# Patient Record
Sex: FEMALE
[De-identification: names, ages, dates, MRNs, and addresses within clinical notes are randomized per-mention and may not be internally consistent; named-entity substitution may affect disease eponyms.]

---

## 2022-08-03 PROBLEM — Z00.00 ENCOUNTER FOR PREVENTIVE HEALTH EXAMINATION: Status: ACTIVE | Noted: 2022-08-03

## 2022-08-09 ENCOUNTER — APPOINTMENT (OUTPATIENT)
Dept: SURGERY | Facility: CLINIC | Age: 22
End: 2022-08-09

## 2023-08-28 ENCOUNTER — EMERGENCY (EMERGENCY)
Facility: HOSPITAL | Age: 23
LOS: 1 days | Discharge: HOME | End: 2023-08-28
Admitting: STUDENT IN AN ORGANIZED HEALTH CARE EDUCATION/TRAINING PROGRAM
Payer: COMMERCIAL

## 2023-08-28 VITALS
RESPIRATION RATE: 18 BRPM | HEART RATE: 88 BPM | OXYGEN SATURATION: 100 % | TEMPERATURE: 98 F | SYSTOLIC BLOOD PRESSURE: 130 MMHG | DIASTOLIC BLOOD PRESSURE: 82 MMHG

## 2023-08-28 VITALS
SYSTOLIC BLOOD PRESSURE: 112 MMHG | DIASTOLIC BLOOD PRESSURE: 86 MMHG | RESPIRATION RATE: 18 BRPM | TEMPERATURE: 98 F | OXYGEN SATURATION: 100 % | HEART RATE: 96 BPM

## 2023-08-28 LAB

## 2023-08-28 PROCEDURE — 99284 EMERGENCY DEPT VISIT MOD MDM: CPT

## 2023-08-28 RX ORDER — ACETAMINOPHEN 500 MG
650 TABLET ORAL ONCE
Refills: 0 | Status: COMPLETED | OUTPATIENT
Start: 2023-08-28 | End: 2023-08-28

## 2023-08-28 RX ORDER — ONDANSETRON 8 MG/1
4 TABLET, FILM COATED ORAL ONCE
Refills: 0 | Status: COMPLETED | OUTPATIENT
Start: 2023-08-28 | End: 2023-08-28

## 2023-08-28 RX ADMIN — Medication 650 MILLIGRAM(S): at 18:48

## 2023-08-28 RX ADMIN — ONDANSETRON 4 MILLIGRAM(S): 8 TABLET, FILM COATED ORAL at 18:48

## 2023-08-28 NOTE — ED PROVIDER NOTE - PATIENT PORTAL LINK FT
You can access the FollowMyHealth Patient Portal offered by [facility] by registering at the following website: [email or web address]. By joining [employer]’s FollowMyHealth portal, you will also be able to view your health information using other applications (apps) compatible with our system.

## 2023-08-28 NOTE — ED PROVIDER NOTE - OBJECTIVE STATEMENT
21 y/o female no pmh c/o flu like illness x1 day. Pt admits to sore throat, headache, myalgias and chills. Pt to cold and flu medicine with little relief. Pt has + sick contact at work who tested + for covid. Pt admits to 1 episodes of emesis. Denies chest pain, sob, abd pain, diarrhea, dysuria, numbness, tingling, weakness, dizziness, syncope, fever or chills.

## 2023-08-28 NOTE — ED ADULT NURSE NOTE - NSFALLUNIVINTERV_ED_ALL_ED
Bed/Stretcher in lowest position, wheels locked, appropriate side rails in place/Call bell, personal items and telephone in reach/Instruct patient to call for assistance before getting out of bed/chair/stretcher/Non-slip footwear applied when patient is off stretcher/[place] to call system/Physically safe environment - no spills, clutter or unnecessary equipment/Purposeful proactive rounding/Room/bathroom lighting operational, light cord in reach

## 2023-08-28 NOTE — ED ADULT TRIAGE NOTE - CHIEF COMPLAINT QUOTE
patient c/o headache, throat hurts, fatigue, malaise since lastnight Pt denies chest pain sob. Denies PHX normal...

## 2023-08-28 NOTE — ED PROVIDER NOTE - CLINICAL SUMMARY MEDICAL DECISION MAKING FREE TEXT BOX
23 y/o female no pmh c/o flu like symptoms x 1 day. + sore throat, headache, myalgias and chills. + sick contact at work with covid. Denies recent travel. Denies chest pain, sob, abd pain, dizziness or syncope. Pt is well appearing, nad, afebrile, no tonsillar exudate, lungs cta b/l ,and soft ntnd. Likely viral illness, low concern for strep or PNA- will check rvp, tylenol, zofran and dc.

## 2023-08-28 NOTE — ED ADULT NURSE NOTE - CHIEF COMPLAINT QUOTE
patient c/o headache, throat hurts, fatigue, malaise since lastnight Pt denies chest pain sob. Denies PHX

## 2023-08-28 NOTE — ED ADULT NURSE NOTE - OBJECTIVE STATEMENT
Pt is a 23 y/o Female, A&Ox4, ambulatory with no reported PHx, presenting to the ED with c/o throat pain, headache and generalized joint pain since last night. Pt states she had positive sick contact. Respirations even and unlabored, chest rise equal b/l. VS as noted in flow sheets. Pt denies chest pain, SOB, fever, cough, chills, abdominal pain, N/V/D, h/a, dizziness, numbness/tingling or any urinary symptoms at this time. Medications administered as ordered, see MAR. No acute distress noted. Safety maintained throughout. Will continue to monitor.

## 2023-08-28 NOTE — ED ADULT NURSE NOTE - CAS DISCH TRANSFER METHOD
Pt employee at [employer] and escorted back to [employer] [facility] with security/Other

## 2023-10-19 ENCOUNTER — EMERGENCY (EMERGENCY)
Facility: HOSPITAL | Age: 23
LOS: 1 days | Discharge: HOME | End: 2023-10-19
Attending: EMERGENCY MEDICINE | Admitting: EMERGENCY MEDICINE
Payer: COMMERCIAL

## 2023-10-19 VITALS
SYSTOLIC BLOOD PRESSURE: 141 MMHG | HEART RATE: 78 BPM | OXYGEN SATURATION: 99 % | TEMPERATURE: 98 F | RESPIRATION RATE: 16 BRPM | DIASTOLIC BLOOD PRESSURE: 87 MMHG

## 2023-10-19 PROCEDURE — 99284 EMERGENCY DEPT VISIT MOD MDM: CPT

## 2023-10-19 RX ORDER — IBUPROFEN 200 MG
1 TABLET ORAL
Qty: 30 | Refills: 0
Start: 2023-10-19

## 2023-10-19 RX ORDER — ACETAMINOPHEN 500 MG
2 TABLET ORAL
Qty: 100 | Refills: 0
Start: 2023-10-19

## 2023-10-19 RX ORDER — PENICILLIN V POTASSIUM 250 MG
500 TABLET ORAL ONCE
Refills: 0 | Status: COMPLETED | OUTPATIENT
Start: 2023-10-19 | End: 2023-10-19

## 2023-10-19 RX ORDER — ACETAMINOPHEN 500 MG
975 TABLET ORAL ONCE
Refills: 0 | Status: COMPLETED | OUTPATIENT
Start: 2023-10-19 | End: 2023-10-19

## 2023-10-19 RX ORDER — PENICILLIN V POTASIUM 500 MG/1
1 TABLET OROPHARYNGEAL
Qty: 30 | Refills: 0
Start: 2023-10-19 | End: 2023-10-28

## 2023-10-19 RX ORDER — DEXAMETHASONE 0.5 MG/5ML
10 ELIXIR ORAL ONCE
Refills: 0 | Status: COMPLETED | OUTPATIENT
Start: 2023-10-19 | End: 2023-10-19

## 2023-10-19 RX ORDER — IBUPROFEN 200 MG
400 TABLET ORAL ONCE
Refills: 0 | Status: COMPLETED | OUTPATIENT
Start: 2023-10-19 | End: 2023-10-19

## 2023-10-19 RX ADMIN — Medication 400 MILLIGRAM(S): at 08:28

## 2023-10-19 RX ADMIN — Medication 10 MILLIGRAM(S): at 08:28

## 2023-10-19 RX ADMIN — Medication 500 MILLIGRAM(S): at 08:29

## 2023-10-19 RX ADMIN — Medication 975 MILLIGRAM(S): at 08:28

## 2023-10-19 NOTE — ED ADULT TRIAGE NOTE - CHIEF COMPLAINT QUOTE
Pt c/o sore throat x 3 days. Endorses pain with swallowing. Denies fever, chills, cough, sob. NAD. Pt talking in full sentences. No Past Medical History.

## 2023-10-19 NOTE — ED PROVIDER NOTE - CLINICAL SUMMARY MEDICAL DECISION MAKING FREE TEXT BOX
Jackie: Sore throat w/ tonsillar exudate. No HIV/mono/STD risks. No cervical LAD. Treat for strep throat.

## 2023-10-19 NOTE — ED PROVIDER NOTE - NSFOLLOWUPINSTRUCTIONS_ED_ALL_ED_FT
Take medications as prescribed for: likely strep throat.    Return if unable to breathe well, swallow liquids, or speak well.

## 2023-10-19 NOTE — ED ADULT NURSE NOTE - NSFALLUNIVINTERV_ED_ALL_ED
Bed/Stretcher in lowest position, wheels locked, appropriate side rails in place/Call bell, personal items and telephone in reach/Instruct patient to call for assistance before getting out of bed/chair/stretcher/Non-slip footwear applied when patient is off stretcher/[place] to call system/Physically safe environment - no spills, clutter or unnecessary equipment/Purposeful proactive rounding/Room/bathroom lighting operational, light cord in reach

## 2023-10-19 NOTE — ED ADULT NURSE NOTE - OBJECTIVE STATEMENT
Patient received in intake room 3, A&Ox4, ambulatory at baseline c/o left sided throat pain. Patient states to have noticed "white stuff" on left side of throat. Airway intact, no drooling noted, no active respiratory distress noted. denies SOB, CP, nausea, vomiting. Breathing even and unlabored. meds given as per order. Awaiting DC paperwork. No IV placed. Safety maintained.

## 2023-10-19 NOTE — ED PROVIDER NOTE - PHYSICAL EXAMINATION
Well-appearing, well nourished, awake, alert, oriented to person, place, time/situation and in no apparent distress.    Airway patent. Neck supple. No cervical LAD. L tonsil w/ exudate. No problem w/ phonation/swallowing/breathing.    Eyes without scleral injection. No jaundice.    Strong pulse.    Respirations unlabored.    Abdomen soft, non-tender, no guarding.    MSK: Spine appears normal, range of motion is not limited, no muscle or joint tenderness.    Alert and oriented, no gross motor or sensory deficits.    Skin: normal color for race, warm, dry and intact. No evidence of rash.    No SI/HI.

## 2023-10-19 NOTE — ED PROVIDER NOTE - PATIENT PORTAL LINK FT
You can access the FollowMyHealth Patient Portal offered by [facility] by registering at the following website: [email or web address]. By joining [employer]’s FollowMyHealth portal, you will also be able to view your health information using other applications (apps) compatible with our system.

## 2023-11-23 ENCOUNTER — EMERGENCY (EMERGENCY)
Facility: HOSPITAL | Age: 23
LOS: 1 days | Discharge: HOME | End: 2023-11-23
Attending: EMERGENCY MEDICINE | Admitting: EMERGENCY MEDICINE
Payer: COMMERCIAL

## 2023-11-23 VITALS
HEART RATE: 75 BPM | DIASTOLIC BLOOD PRESSURE: 52 MMHG | TEMPERATURE: 98 F | SYSTOLIC BLOOD PRESSURE: 124 MMHG | RESPIRATION RATE: 16 BRPM | OXYGEN SATURATION: 100 %

## 2023-11-23 PROCEDURE — 10060 I&D ABSCESS SIMPLE/SINGLE: CPT

## 2023-11-23 PROCEDURE — 99284 EMERGENCY DEPT VISIT MOD MDM: CPT | Mod: 25

## 2023-11-23 RX ORDER — CEPHALEXIN 500 MG
500 CAPSULE ORAL
Refills: 0 | Status: DISCONTINUED | OUTPATIENT
Start: 2023-11-23 | End: 2023-11-26

## 2023-11-23 RX ORDER — KETOROLAC TROMETHAMINE 30 MG/ML
15 SYRINGE (ML) INJECTION ONCE
Refills: 0 | Status: DISCONTINUED | OUTPATIENT
Start: 2023-11-23 | End: 2023-11-23

## 2023-11-23 RX ORDER — CEPHALEXIN 500 MG
1 CAPSULE ORAL
Qty: 20 | Refills: 0
Start: 2023-11-23 | End: 2023-11-27

## 2023-11-23 RX ADMIN — Medication 500 MILLIGRAM(S): at 10:04

## 2023-11-23 RX ADMIN — Medication 15 MILLIGRAM(S): at 10:04

## 2023-11-23 NOTE — ED ADULT NURSE NOTE - NSFALLRISKINTERV_ED_ALL_ED
Assistance with ambulation/Communicate fall risk and risk factors to all staff, patient, and family/Provide visual cue: yellow wristband, yellow gown, etc/Reinforce activity limits and safety measures with patient and family/Call bell, personal items and telephone in reach/Instruct patient to call for assistance before getting out of bed/chair/stretcher/Non-slip footwear applied when patient is off stretcher/[place] to call system/Physically safe environment - no spills, clutter or unnecessary equipment/Purposeful Proactive Rounding/Room/bathroom lighting operational, light cord in reach

## 2023-11-23 NOTE — ED PROCEDURE NOTE - NS_EDPROVIDERDISPOUSERTYPE_ED_A_ED
solids
Attending Attestation (For Attendings USE Only)...
Attending Attestation (For Attendings USE Only)...

## 2023-11-23 NOTE — ED PROVIDER NOTE - NSFOLLOWUPINSTRUCTIONS_ED_ALL_ED_FT
Skin Abscess  Close-up of an abscess on the skin.  A skin abscess is an infected spot of skin. It can have pus in it. An abscess can happen in any part of your body.    Some abscesses break open (rupture) on their own. Most keep getting worse unless they are treated. If your abscess is not treated, the infection can spread deeper into your body and blood. This can make you feel sick.    What are the causes?  Germs that enter your skin. This may happen if you have:  A cut or scrape.  A wound from a needle or an insect bite.  Blocked oil or sweat glands.  A problem with the spot where your hair goes into your skin.  A fluid-filled sac called a cyst under your skin.  What increases the risk?  Having problems with how your blood moves through your body.  Having a weak body defense system (immune system).  Having diabetes.  Having dry and irritated skin.  Needing to get shots often.  Putting drugs into your body with a needle.  Having a splinter or something else in your skin.  Smoking.  What are the signs or symptoms?  A firm bump under your skin that hurts.  A bump with pus at the top.  Redness and swelling.  Warm or tender spots.  A sore on the skin.  How is this treated?  You may need to:  Put a heat pack or a warm, wet washcloth on the spot.  Have the pus drained.  Take antibiotics.  Follow these instructions at home:  Medicines    Take over-the-counter and prescription medicines only as told by your doctor.  If you were prescribed antibiotics, take them as told by your doctor. Do not stop taking them even if you start to feel better.  Abscess care    Washing hands with soap and water.  If you have an abscess that has not drained, put heat on it. Use the heat source that your doctor recommends, such as a moist heat pack or a heating pad.  Place a towel between your skin and the heat source.  Leave the heat on for 20–30 minutes.  If your skin turns bright red, take off the heat right away to prevent burns. The risk of burns is higher if you cannot feel pain, heat, or cold.  Follow instructions from your doctor about how to take care of your abscess. Make sure you:  Cover the abscess with a bandage.  Wash your hands with soap and water for at least 20 seconds before and after you change your bandage. If you cannot use soap and water, use hand .  Change your bandage as told by your doctor.  Check your abscess every day for signs that the infection is getting worse. Check for:  More redness, swelling, or pain.  More fluid or blood.  Warmth.  More pus or a worse smell.  General instructions    To keep the infection from spreading:  Do not share personal items or towels.  Do not go in a hot tub with others.  Avoid making skin contact with others.  Be careful when you get rid of used bandages or any pus from the abscess.  Do not smoke or use any products that contain nicotine or tobacco. If you need help quitting, ask your doctor.  Contact a doctor if:  You see red streaks on your skin near the abscess.  You have any signs of worse infection.  You vomit every time you eat or drink.  You have a fever, chills, or muscle aches.  The cyst or abscess comes back.  Get help right away if:  You have very bad pain.  You make less pee (urine) than normal.  This information is not intended to replace advice given to you by your health care provider. Make sure you discuss any questions you have with your health care provider.

## 2023-11-23 NOTE — ED PROCEDURE NOTE - NS ED ATTENDING STATEMENT MOD
This was a shared visit with the SHEFALI. I reviewed and verified the documentation and independently performed the documented:
Attending with

## 2023-11-23 NOTE — ED PROVIDER NOTE - ATTENDING APP SHARED VISIT CONTRIBUTION OF CARE
22 F complaining of left upper back pain and swelling for 2 days.  Patient states has noticed small firm area for 7 years.  Was told it might be "cyst."  Was referred to Derm but did not follow up. Now has [facility] pain [facility] "soft" center, worried is infected. No fever/chills. Last used PCN 1 mo. ago for ? strep throat. No hx of skin infection/abscess. L upper back 2x2cm area of swelling w central tenderness, no surrounding erythema, no heat.

## 2023-11-23 NOTE — ED PROVIDER NOTE - NS ED ATTENDING STATEMENT MOD
This was a shared visit with the SHEFALI. I reviewed and verified the documentation and independently performed the documented:

## 2023-11-23 NOTE — ED PROVIDER NOTE - PROGRESS NOTE DETAILS
Pt. with small area of ? fluid and likely sebaceous cyst. Needle aspiration w 1.5 cc of brown pus. Pt. sahara. well. Derick: Pt. with small area of ? fluid and likely sebaceous cyst. Needle aspiration w 1.5 cc of brown pus. Pt. sahara. well.

## 2023-11-23 NOTE — ED ADULT NURSE NOTE - OBJECTIVE STATEMENT
Pt A+Ox4. Pt c/o an abscess to left upper back.  Pt states it has been there about 7 years and was told it was a cyst.  It normally is hard but a few days ago it became "mushy and soft".  Pt lungs clear, equal and unlabored, abdomen soft.  Pt awaiting Ultrasound.  Will continue to monitor.

## 2023-11-23 NOTE — ED PROVIDER NOTE - CLINICAL SUMMARY MEDICAL DECISION MAKING FREE TEXT BOX
21 yo F no PMH p/w increased tenderness to posterior thoracic since yesterday. Diagnosed cyst in her back x7 years. Described as normally hard with a central opening became soft and mushy with increased tenderness and pressure. Has not taken any antibiotics previously or pain medications. LMP x2 weeks ago. Toradol, Keflex, US  Derm Cyst vs. Abscess  Dispo with follow up with PCP 23 yo F no PMH p/w increased tenderness to posterior thoracic since yesterday. Diagnosed cyst in her back x7 years. Described as normally hard with a central opening became soft and mushy with increased tenderness and pressure. Has not taken any antibiotics previously or pain medications. LMP x2 weeks ago. Toradol, Keflex, US.   Derm Cyst vs. Abscess  Dispo with follow up with PCP 21 yo F no PMH p/w increased tenderness to posterior thoracic since yesterday. Diagnosed cyst in her back x7 years. Described as normally hard with a central opening became soft and mushy with increased tenderness and pressure. Has not taken any antibiotics previously or pain medications. LMP x2 weeks ago. Toradol, Keflex, US.   Derm Cyst vs. Abscess  Dispo with follow up with PCP 21 yo F no PMH p/w increased tenderness to posterior thoracic since yesterday. Diagnosed cyst in her back x7 years. Described as normally hard with a central opening became soft and mushy with increased tenderness and pressure. Has not taken any antibiotics previously or pain medications. LMP x2 weeks ago. Toradol, Keflex, US.   Derm Cyst vs. Abscess  Dispo with follow up with PCP. 23 yo F no PMH p/w increased tenderness to posterior thoracic since yesterday. Diagnosed cyst in her back x7 years. Described as normally hard with a central opening became soft and mushy with increased tenderness and pressure. Has not taken any antibiotics previously or pain medications. LMP x2 weeks ago. Toradol, Keflex, US.   Derm Cyst vs. Abscess  Dispo with follow up with PCP.

## 2023-11-23 NOTE — ED PROVIDER NOTE - PATIENT PORTAL LINK FT
You can access the FollowMyHealth Patient Portal offered by [facility] by registering at the following website: [email or web address]. By joining [employer]’s FollowMyHealth portal, you will also be able to view your health information using other applications (apps) compatible with our system. You can access the FollowMyHealth Patient Portal offered by [facility] by registering at the following website: [email or web address]. By joining [employer]’s FollowMyHealth portal, you will also be able to view your health information using other applications (apps) compatible with our system. You can access the FollowMyHealth Patient Portal offered by [facility] by registering at the following website: [email or web address]. By joining [employer]’s FollowMyHealth portal, you will also be able to view your health information using other applications (apps) compatible with our system.

## 2023-11-23 NOTE — ED PROVIDER NOTE - OBJECTIVE STATEMENT
23 yo F no PMH p/w increased tenderness to posterior thoracic since yesterday. Reports she has had a previously diagnosed cyst in her back x7 years. Was previously seen by her PCP and given a referral to dermatology but did not follow up. Reports yesterday that the cyst which is normally hard with a central opening became soft and mushy with increased tenderness and pressure. Has not taken any antibiotics previously or pain medications. LMP x2 weeks ago. Denies fever, chills, chest pain, shortness of breath, sick contact or recent travel. 21 yo F no PMH p/w increased tenderness to posterior thoracic since yesterday. Reports she has had a previously diagnosed cyst in her back x7 years. Was previously seen by her PCP and given a referral to dermatology but did not follow up. Reports yesterday that the cyst which is normally hard with a central opening became soft and mushy with increased tenderness and pressure. Has not taken any antibiotics previously or pain medications. LMP x2 weeks ago. Denies fever, chills, chest pain, shortness of breath, sick contact or recent travel.

## 2024-01-07 ENCOUNTER — EMERGENCY (EMERGENCY)
Facility: HOSPITAL | Age: 24
LOS: 0 days | Discharge: HOME | End: 2024-01-07
Attending: STUDENT IN AN ORGANIZED HEALTH CARE EDUCATION/TRAINING PROGRAM
Payer: COMMERCIAL

## 2024-01-07 VITALS
DIASTOLIC BLOOD PRESSURE: 72 MMHG | HEART RATE: 97 BPM | HEIGHT: 69 IN | OXYGEN SATURATION: 98 % | RESPIRATION RATE: 20 BRPM | TEMPERATURE: 98 F | SYSTOLIC BLOOD PRESSURE: 108 MMHG | WEIGHT: 293 LBS

## 2024-01-07 VITALS
HEART RATE: 87 BPM | DIASTOLIC BLOOD PRESSURE: 70 MMHG | OXYGEN SATURATION: 98 % | RESPIRATION RATE: 20 BRPM | TEMPERATURE: 99 F | SYSTOLIC BLOOD PRESSURE: 112 MMHG

## 2024-01-07 DIAGNOSIS — U07.1 COVID-19: ICD-10-CM

## 2024-01-07 DIAGNOSIS — R51.9 HEADACHE, UNSPECIFIED: ICD-10-CM

## 2024-01-07 LAB
FLUAV AG NPH QL: SIGNIFICANT CHANGE UP
FLUAV AG NPH QL: SIGNIFICANT CHANGE UP
FLUBV AG NPH QL: SIGNIFICANT CHANGE UP
FLUBV AG NPH QL: SIGNIFICANT CHANGE UP
SARS-COV-2 RNA SPEC QL NAA+PROBE: DETECTED
SARS-COV-2 RNA SPEC QL NAA+PROBE: DETECTED

## 2024-01-07 PROCEDURE — 99284 EMERGENCY DEPT VISIT MOD MDM: CPT

## 2024-01-07 RX ORDER — IBUPROFEN 200 MG
600 TABLET ORAL ONCE
Refills: 0 | Status: DISCONTINUED | OUTPATIENT
Start: 2024-01-07 | End: 2024-01-07

## 2024-01-07 NOTE — ED PROVIDER NOTE - NSFOLLOWUPINSTRUCTIONS_ED_ALL_ED_FT
Rest, drink plenty of fluids  Advance activity as tolerated  Continue all previously prescribed medications as directed  Follow up with your PMD - bring copies of your results  Return to the ER for chest pain, difficulty breathing, worsening symptoms, or other new or concerning symptoms  For pain, you may take Tylenol 975mg every six hours and supplement with ibuprofen 600mg, with food, every six hours which can be taken three hours apart from the Tylenol to have a layered effect.

## 2024-01-07 NOTE — ED ADULT NURSE NOTE - NSFALLUNIVINTERV_ED_ALL_ED
Bed/Stretcher in lowest position, wheels locked, appropriate side rails in place/Call bell, personal items and telephone in reach/Instruct patient to call for assistance before getting out of bed/chair/stretcher/Non-slip footwear applied when patient is off stretcher/[place] to call system/Physically safe environment - no spills, clutter or unnecessary equipment/Purposeful proactive rounding/Room/bathroom lighting operational, light cord in reach Bed/Stretcher in lowest position, wheels locked, appropriate side rails in place/Call bell, personal items and telephone in reach/Instruct patient to call for assistance before getting out of bed/chair/stretcher/Non-slip footwear applied when patient is off stretcher/[place] to call system/Physically safe environment - no spills, clutter or unnecessary equipment/Purposeful proactive rounding/Room/bathroom lighting operational, light cord in reach

## 2024-01-07 NOTE — ED PROVIDER NOTE - CLINICAL SUMMARY MEDICAL DECISION MAKING FREE TEXT BOX
22yo female with no pmh presenting with headache, sore throat, cough.  Started yesterday.  HA generalized, mild.  Nonproductive cough.  No sob, cp, abd pain, n/v/d, fever.  Took tylenol this morning with some relief.  Exam here nonfocal.  Likely viral syndrome.  On screening, patient mentioned to nurse that she had SI 2 days ago.  This has never happened before, she feels fine now and never had a plan.  No access to firearms.  Never seen psych.  States she only had this thought in passing due to a hard day and she went and spoke with her mom and brother about it and they went out.  No HI, hallucinations.  Clinically appears psychiatrically safe for dc with outpatient follow up and patient verbalized understanding of return precautions.  Will medicate, swab, dc.

## 2024-01-07 NOTE — ED ADULT NURSE NOTE - OBJECTIVE STATEMENT
A&OX4, patient C/O flu-like symptoms . patient states runny nose/ body aches/ HA sore throat denies chest pain or SOB . patient denies any recent travels/ patient states feeling depressed thought of hurting her self 2dayx ago no active plan/ denies homicidal or suicidal ideation at this time . no relief with OTC meds PMH PCOS denies fevers

## 2024-01-07 NOTE — ED ADULT NURSE REASSESSMENT NOTE - NS ED NURSE REASSESS COMMENT FT1
MD hager, made aware of depression and  recent homicidal ideation at this time. Yoly enamorado placed on unofficial 1;1 at this time

## 2024-01-07 NOTE — ED ADULT TRIAGE NOTE - CHIEF COMPLAINT QUOTE
Right sided ha yesterday, today on the left side with generalized body pain and sore throat.  LMP 1/4/24. Denies history.

## 2024-01-07 NOTE — ED PROVIDER NOTE - PHYSICAL EXAMINATION
General appearance: Nontoxic appearing, conversant, afebrile    Eyes: anicteric sclerae, [place], EOMI   HENT: Atraumatic; oropharynx clear, MMM and no ulcerations, no pharyngeal erythema or exudate   Neck: Trachea midline; Full range of motion, supple   Pulm: CTA bl, normal respiratory effort and no intercostal retractions, normal work of breathing   CV: RRR, No murmurs, rubs, or gallops   Abdomen: Soft, non-tender, non-distended; no guarding or rebound   Extremities: No peripheral edema, no gross deformities, FROM x4   Skin: Dry, normal temperature, turgor and texture; no rash   Psych: Appropriate affect, cooperative, calm

## 2024-01-07 NOTE — ED PROVIDER NOTE - PATIENT PORTAL LINK FT
You can access the FollowMyHealth Patient Portal offered by [facility] by registering at the following website: [email or web address]. By joining [employer]’s FollowMyHealth portal, you will also be able to view your health information using other applications (apps) compatible with our system. You can access the FollowMyHealth Patient Portal offered by [facility] by registering at the following website: [email or web address]. By joining [employer]’s FollowMyHealth portal, you will also be able to view your health information using other applications (apps) compatible with our system.

## 2024-10-30 ENCOUNTER — EMERGENCY (EMERGENCY)
Facility: HOSPITAL | Age: 24
LOS: 1 days | Discharge: HOME | End: 2024-10-30
Attending: EMERGENCY MEDICINE | Admitting: EMERGENCY MEDICINE
Payer: COMMERCIAL

## 2024-10-30 VITALS
SYSTOLIC BLOOD PRESSURE: 119 MMHG | HEIGHT: 69 IN | OXYGEN SATURATION: 97 % | TEMPERATURE: 98 F | DIASTOLIC BLOOD PRESSURE: 86 MMHG | RESPIRATION RATE: 16 BRPM | HEART RATE: 99 BPM | WEIGHT: 293 LBS

## 2024-10-30 VITALS
HEART RATE: 72 BPM | RESPIRATION RATE: 17 BRPM | SYSTOLIC BLOOD PRESSURE: 112 MMHG | OXYGEN SATURATION: 100 % | TEMPERATURE: 98 F | DIASTOLIC BLOOD PRESSURE: 65 MMHG

## 2024-10-30 LAB
ALBUMIN SERPL ELPH-MCNC: 3.8 G/DL — SIGNIFICANT CHANGE UP (ref 3.3–5)
ALP SERPL-CCNC: 81 U/L — SIGNIFICANT CHANGE UP (ref 40–120)
ALT FLD-CCNC: 20 U/L — SIGNIFICANT CHANGE UP (ref 4–33)
ANION GAP SERPL CALC-SCNC: 12 MMOL/L — SIGNIFICANT CHANGE UP (ref 7–14)
AST SERPL-CCNC: 36 U/L — HIGH (ref 4–32)
BASOPHILS # BLD AUTO: 0.04 K/UL — SIGNIFICANT CHANGE UP (ref 0–0.2)
BASOPHILS NFR BLD AUTO: 0.4 % — SIGNIFICANT CHANGE UP (ref 0–2)
BILIRUB SERPL-MCNC: 0.4 MG/DL — SIGNIFICANT CHANGE UP (ref 0.2–1.2)
BUN SERPL-MCNC: 12 MG/DL — SIGNIFICANT CHANGE UP (ref 7–23)
CALCIUM SERPL-MCNC: 9.1 MG/DL — SIGNIFICANT CHANGE UP (ref 8.4–10.5)
CHLORIDE SERPL-SCNC: 106 MMOL/L — SIGNIFICANT CHANGE UP (ref 98–107)
CO2 SERPL-SCNC: 19 MMOL/L — LOW (ref 22–31)
CREAT SERPL-MCNC: 0.77 MG/DL — SIGNIFICANT CHANGE UP (ref 0.5–1.3)
D DIMER BLD IA.RAPID-MCNC: 183 NG/ML DDU — SIGNIFICANT CHANGE UP
EGFR: 110 ML/MIN/1.73M2 — SIGNIFICANT CHANGE UP
EGFR: 110 ML/MIN/1.73M2 — SIGNIFICANT CHANGE UP
EOSINOPHIL # BLD AUTO: 0.2 K/UL — SIGNIFICANT CHANGE UP (ref 0–0.5)
EOSINOPHIL NFR BLD AUTO: 2 % — SIGNIFICANT CHANGE UP (ref 0–6)
FLUAV AG NPH QL: SIGNIFICANT CHANGE UP
FLUBV AG NPH QL: SIGNIFICANT CHANGE UP
GLUCOSE SERPL-MCNC: 81 MG/DL — SIGNIFICANT CHANGE UP (ref 70–99)
HCG SERPL-ACNC: <1 MIU/ML — SIGNIFICANT CHANGE UP
HCT VFR BLD CALC: 40.2 % — SIGNIFICANT CHANGE UP (ref 34.5–45)
HGB BLD-MCNC: 14.3 G/DL — SIGNIFICANT CHANGE UP (ref 11.5–15.5)
IANC: 5.79 K/UL — SIGNIFICANT CHANGE UP (ref 1.8–7.4)
IMM GRANULOCYTES NFR BLD AUTO: 0.2 % — SIGNIFICANT CHANGE UP (ref 0–0.9)
LYMPHOCYTES # BLD AUTO: 2.56 K/UL — SIGNIFICANT CHANGE UP (ref 1–3.3)
LYMPHOCYTES # BLD AUTO: 26.1 % — SIGNIFICANT CHANGE UP (ref 13–44)
MCHC RBC-ENTMCNC: 29.3 PG — SIGNIFICANT CHANGE UP (ref 27–34)
MCHC RBC-ENTMCNC: 35.6 G/DL — SIGNIFICANT CHANGE UP (ref 32–36)
MCV RBC AUTO: 82.4 FL — SIGNIFICANT CHANGE UP (ref 80–100)
MONOCYTES # BLD AUTO: 1.21 K/UL — HIGH (ref 0–0.9)
MONOCYTES NFR BLD AUTO: 12.3 % — SIGNIFICANT CHANGE UP (ref 2–14)
NEUTROPHILS # BLD AUTO: 5.79 K/UL — SIGNIFICANT CHANGE UP (ref 1.8–7.4)
NEUTROPHILS NFR BLD AUTO: 59 % — SIGNIFICANT CHANGE UP (ref 43–77)
NRBC # BLD AUTO: 0 K/UL — SIGNIFICANT CHANGE UP (ref 0–0)
NRBC # BLD: 0 /100 WBCS — SIGNIFICANT CHANGE UP (ref 0–0)
NRBC # FLD: 0 K/UL — SIGNIFICANT CHANGE UP (ref 0–0)
NRBC BLD-RTO: 0 /100 WBCS — SIGNIFICANT CHANGE UP (ref 0–0)
PLATELET # BLD AUTO: 265 K/UL — SIGNIFICANT CHANGE UP (ref 150–400)
POTASSIUM SERPL-MCNC: 5.3 MMOL/L — SIGNIFICANT CHANGE UP (ref 3.5–5.3)
POTASSIUM SERPL-SCNC: 5.3 MMOL/L — SIGNIFICANT CHANGE UP (ref 3.5–5.3)
PROT SERPL-MCNC: 7.7 G/DL — SIGNIFICANT CHANGE UP (ref 6–8.3)
RBC # BLD: 4.88 M/UL — SIGNIFICANT CHANGE UP (ref 3.8–5.2)
RBC # FLD: 13.2 % — SIGNIFICANT CHANGE UP (ref 10.3–14.5)
RSV RNA NPH QL NAA+NON-PROBE: SIGNIFICANT CHANGE UP
SARS-COV-2 RNA SPEC QL NAA+PROBE: SIGNIFICANT CHANGE UP
SODIUM SERPL-SCNC: 137 MMOL/L — SIGNIFICANT CHANGE UP (ref 135–145)
WBC # BLD: 9.82 K/UL — SIGNIFICANT CHANGE UP (ref 3.8–10.5)
WBC # FLD AUTO: 9.82 K/UL — SIGNIFICANT CHANGE UP (ref 3.8–10.5)

## 2024-10-30 PROCEDURE — 99284 EMERGENCY DEPT VISIT MOD MDM: CPT

## 2024-10-30 PROCEDURE — 71046 X-RAY EXAM CHEST 2 VIEWS: CPT | Mod: 26

## 2024-10-30 RX ORDER — SODIUM CHLORIDE 9 G/1000ML
1000 INJECTION, SOLUTION INTRAVENOUS ONCE
Refills: 0 | Status: COMPLETED | OUTPATIENT
Start: 2024-10-30 | End: 2024-10-30

## 2024-10-30 RX ORDER — ACETAMINOPHEN 500 MG/5ML
1000 LIQUID (ML) ORAL ONCE
Refills: 0 | Status: COMPLETED | OUTPATIENT
Start: 2024-10-30 | End: 2024-10-30

## 2024-10-30 RX ADMIN — Medication 1000 MILLIGRAM(S): at 18:42

## 2024-10-30 RX ADMIN — Medication 400 MILLIGRAM(S): at 18:12

## 2024-10-30 RX ADMIN — Medication 1000 MILLIGRAM(S): at 18:27

## 2024-10-30 RX ADMIN — SODIUM CHLORIDE 1000 MILLILITER(S): 9 INJECTION, SOLUTION INTRAVENOUS at 18:11

## 2024-10-31 PROBLEM — Z78.9 OTHER SPECIFIED HEALTH STATUS: Chronic | Status: ACTIVE | Noted: 2023-08-28

## 2025-03-19 NOTE — ED PROVIDER NOTE - CCCP TRG CHIEF CMPLNT
Attempt to contact patient at listed number.Unable to contact. Voice message left to return call to clinic [phone].   
Pt return call to clinic. Informed patient of PCP response regarding patient request to have further test done regarding have GI symptom. Informed patient that PCP state that if patient continues to ave issue she may consider a GI Referral. Referral placed to Gastro. Pt verbalized understanding of information given.   
body aches/headache

## 2025-09-13 ENCOUNTER — EMERGENCY (EMERGENCY)
Facility: HOSPITAL | Age: 25
LOS: 0 days | Discharge: HOME | End: 2025-09-13
Attending: STUDENT IN AN ORGANIZED HEALTH CARE EDUCATION/TRAINING PROGRAM
Payer: COMMERCIAL

## 2025-09-13 VITALS
OXYGEN SATURATION: 95 % | SYSTOLIC BLOOD PRESSURE: 94 MMHG | RESPIRATION RATE: 18 BRPM | TEMPERATURE: 98 F | HEART RATE: 76 BPM | DIASTOLIC BLOOD PRESSURE: 71 MMHG

## 2025-09-13 VITALS
HEIGHT: 69 IN | TEMPERATURE: 98 F | HEART RATE: 73 BPM | SYSTOLIC BLOOD PRESSURE: 108 MMHG | OXYGEN SATURATION: 97 % | WEIGHT: 281.09 LBS | DIASTOLIC BLOOD PRESSURE: 76 MMHG | RESPIRATION RATE: 20 BRPM

## 2025-09-13 DIAGNOSIS — S20.02XA CONTUSION OF LEFT BREAST, INITIAL ENCOUNTER: ICD-10-CM
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
## 2025-09-13 DIAGNOSIS — M79.662 PAIN IN LEFT LOWER LEG: ICD-10-CM

## 2025-09-13 DIAGNOSIS — M25.532 PAIN IN LEFT WRIST: ICD-10-CM

## 2025-09-13 DIAGNOSIS — S80.12XA CONTUSION OF LEFT LOWER LEG, INITIAL ENCOUNTER: ICD-10-CM
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
## 2025-09-13 DIAGNOSIS — N64.4 MASTODYNIA: ICD-10-CM

## 2025-09-13 PROCEDURE — 99284 EMERGENCY DEPT VISIT MOD MDM: CPT

## 2025-09-13 PROCEDURE — 71045 X-RAY EXAM CHEST 1 VIEW: CPT | Mod: 26

## 2025-09-13 PROCEDURE — 73590 X-RAY EXAM OF LOWER LEG: CPT | Mod: 26,LT

## 2025-09-13 PROCEDURE — 73110 X-RAY EXAM OF WRIST: CPT | Mod: 26,LT

## 2025-09-13 RX ORDER — ACETAMINOPHEN 500 MG/5ML
650 LIQUID (ML) ORAL ONCE
Refills: 0 | Status: COMPLETED | OUTPATIENT
Start: 2025-09-13 | End: 2025-09-13

## 2025-09-13 RX ADMIN — Medication 650 MILLIGRAM(S): at 12:53

## 2025-09-13 RX ADMIN — Medication 650 MILLIGRAM(S): at 12:23
